# Patient Record
Sex: FEMALE | Employment: UNEMPLOYED | URBAN - METROPOLITAN AREA
[De-identification: names, ages, dates, MRNs, and addresses within clinical notes are randomized per-mention and may not be internally consistent; named-entity substitution may affect disease eponyms.]

---

## 2019-08-08 ENCOUNTER — HOSPITAL ENCOUNTER (EMERGENCY)
Facility: CLINIC | Age: 29
Discharge: HOME | End: 2019-08-09
Attending: EMERGENCY MEDICINE | Admitting: EMERGENCY MEDICINE
Payer: COMMERCIAL

## 2019-08-08 DIAGNOSIS — Z11.4 ENCOUNTER FOR HIV (HUMAN IMMUNODEFICIENCY VIRUS) TEST: ICD-10-CM

## 2019-08-08 DIAGNOSIS — R51.9 NONINTRACTABLE EPISODIC HEADACHE, UNSPECIFIED HEADACHE TYPE: ICD-10-CM

## 2019-08-08 PROCEDURE — 99283 EMERGENCY DEPT VISIT LOW MDM: CPT | Performed by: EMERGENCY MEDICINE

## 2019-08-08 PROCEDURE — 99283 EMERGENCY DEPT VISIT LOW MDM: CPT | Mod: Z6 | Performed by: EMERGENCY MEDICINE

## 2019-08-08 PROCEDURE — 25000132 ZZH RX MED GY IP 250 OP 250 PS 637: Performed by: EMERGENCY MEDICINE

## 2019-08-08 RX ORDER — METOCLOPRAMIDE HYDROCHLORIDE 5 MG/ML
10 INJECTION INTRAMUSCULAR; INTRAVENOUS ONCE
Status: DISCONTINUED | OUTPATIENT
Start: 2019-08-08 | End: 2019-08-08

## 2019-08-08 RX ORDER — IBUPROFEN 600 MG/1
600 TABLET, FILM COATED ORAL ONCE
Status: COMPLETED | OUTPATIENT
Start: 2019-08-08 | End: 2019-08-08

## 2019-08-08 RX ADMIN — IBUPROFEN 600 MG: 600 TABLET ORAL at 23:57

## 2019-08-08 ASSESSMENT — ENCOUNTER SYMPTOMS
PHOTOPHOBIA: 0
CHILLS: 0
NAUSEA: 1
HEADACHES: 1
CONSTIPATION: 0
DIARRHEA: 0
DYSURIA: 0
FEVER: 0
ABDOMINAL PAIN: 1

## 2019-08-08 NOTE — ED AVS SNAPSHOT
[facility], [place], Emergency Department  [address]  [place] [address]  Phone:  [phone]  Fax:  [phone]                                    Irena Velasco   MRN: [id number]    Department:  [facility], Emergency Department   Date of Visit:  8/8/2019           After Visit Summary Signature Page    I have received my discharge instructions, and my questions have been answered. I have discussed any challenges I see with this plan with the nurse or doctor.    ..........................................................................................................................................  Patient/Patient Representative Signature      ..........................................................................................................................................  Patient Representative Print Name and Relationship to Patient    ..................................................               ................................................  Date                                   Time    ..........................................................................................................................................  Reviewed by Signature/Title    ...................................................              ..............................................  Date                                               Time          22EPIC Rev 08/18

## 2019-08-09 VITALS
RESPIRATION RATE: 16 BRPM | TEMPERATURE: 97.5 F | DIASTOLIC BLOOD PRESSURE: 57 MMHG | HEART RATE: 62 BPM | OXYGEN SATURATION: 100 % | SYSTOLIC BLOOD PRESSURE: 106 MMHG | WEIGHT: 182 LBS

## 2019-08-09 LAB
ALBUMIN UR-MCNC: NEGATIVE MG/DL
APPEARANCE UR: CLEAR
BILIRUB UR QL STRIP: NEGATIVE
COLOR UR AUTO: YELLOW
GLUCOSE UR STRIP-MCNC: NEGATIVE MG/DL
HCG UR QL: NEGATIVE
HGB UR QL STRIP: ABNORMAL
KETONES UR STRIP-MCNC: NEGATIVE MG/DL
LEUKOCYTE ESTERASE UR QL STRIP: NEGATIVE
MUCOUS THREADS #/AREA URNS LPF: PRESENT /LPF
NITRATE UR QL: NEGATIVE
PH UR STRIP: 8 PH (ref 5–7)
RBC #/AREA URNS AUTO: 6 /HPF (ref 0–2)
SOURCE: ABNORMAL
SP GR UR STRIP: 1.01 (ref 1–1.03)
SQUAMOUS #/AREA URNS AUTO: 1 /HPF (ref 0–1)
UROBILINOGEN UR STRIP-MCNC: NORMAL MG/DL (ref 0–2)
WBC #/AREA URNS AUTO: <1 /HPF (ref 0–5)

## 2019-08-09 PROCEDURE — 81001 URINALYSIS AUTO W/SCOPE: CPT | Performed by: EMERGENCY MEDICINE

## 2019-08-09 PROCEDURE — 84145 PROCALCITONIN (PCT): CPT | Performed by: EMERGENCY MEDICINE

## 2019-08-09 PROCEDURE — 81025 URINE PREGNANCY TEST: CPT | Performed by: EMERGENCY MEDICINE

## 2019-08-09 NOTE — ED NOTES
Patient refusing IV insertion, IV fluids, med at this time.  Reports she wants to see the doctor before anything is done.

## 2019-08-09 NOTE — DISCHARGE INSTRUCTIONS
Please make an appointment to follow up with Your Primary Care Provider in 3-4 days as needed.    You will be called if your HIV test results positive and you require follow up. You will not be called if the results are normal and you do not have HIV.

## 2019-08-09 NOTE — ED PROVIDER NOTES
History     Chief Complaint   Patient presents with     Headache     Pt c/o HA x 2 days. Pt c/o nausea.     The history is provided by the patient. A  was used (iPad, [employer]).     Irena Velasco is an otherwise healthy 29 year old female who presents for evaluation of a headache and abdominal pain and concern for HIV infection. Patient reports she's had these pains for the last 2 days. Her abdominal pain is lower, along her midline, and has been associated with some mild vaginal discharge. She denies dysuria or vaginal bleeding. Patient is sexually active, though is not concerned for an STD. However, she does reports her LMP was on 6/30 and that she completely missed her July period. Patient states they're usually regular.      Her headache is mild, bilateral, and constant for the last 2 nights. She states she's had similar headaches before, though they're usually intermittent. Patient denies photophobia or auditory sensitivity. Denies diarrhea, constipation, fevers, or chills but is somewhat nauseated.     Of note, patient is concerned she may have HIV. Her sister, who had been recently diagnosed with HIV, had visited from [place], sharing utensils, cups, nail clippers, toothbrushes, etc.    History reviewed. No pertinent past medical history.    History reviewed. No pertinent surgical history.    History reviewed. No pertinent family history.    Social History     Tobacco Use     Smoking status: Never Smoker     Smokeless tobacco: Never Used   Substance Use Topics     Alcohol use: Not on file       No current facility-administered medications for this encounter.      No current outpatient medications on file.      No Known Allergies    I have reviewed the Medications, Allergies, Past Medical and Surgical History, and Social History in the Epic system.    Review of Systems   Constitutional: Negative for chills and fever.   Eyes: Negative for photophobia.   Gastrointestinal: Positive for abdominal  pain and nausea. Negative for constipation and diarrhea.   Genitourinary: Positive for vaginal discharge. Negative for dysuria and vaginal bleeding.   Neurological: Positive for headaches.   All other systems reviewed and are negative.      Physical Exam   BP: 108/40  Pulse: 77  Temp: 97.5  F (36.4  C)  Resp: 16  Weight: 82.6 kg (182 lb)  SpO2: 100 %      Physical Exam   Constitutional: She is oriented to person, place, and time. She appears well-developed and well-nourished. No distress.   HENT:   Head: Normocephalic and atraumatic.   Nose: Nose normal.   Mouth/Throat: Oropharynx is clear and moist.   Eyes: Pupils are equal, round, and reactive to light. Conjunctivae and EOM are normal. No scleral icterus.   Neck: Normal range of motion. Neck supple.   Cardiovascular: Normal rate.   Pulmonary/Chest: Effort normal. No stridor. No respiratory distress.   Abdominal: Soft. She exhibits no distension. There is no tenderness. There is no rebound and no guarding. No hernia.   Musculoskeletal: Normal range of motion. She exhibits no deformity.   Neurological: She is alert and oriented to person, place, and time. No cranial nerve deficit or sensory deficit. She exhibits normal muscle tone.   Skin: Skin is warm and dry. She is not diaphoretic. No pallor.   Psychiatric: She has a normal mood and affect. Her behavior is normal.   Nursing note and vitals reviewed.      ED Course        Procedures       10:54 PM  The patient was seen and examined by Dr. Parham in Room 19.          Critical Care time:  none             Labs Ordered and Resulted from Time of ED Arrival Up to the Time of Departure from the ED   ROUTINE UA WITH MICROSCOPIC REFLEX TO CULTURE - Abnormal; Notable for the following components:       Result Value    Blood Urine Small (*)     pH Urine 8.0 (*)     RBC Urine 6 (*)     Mucous Urine Present (*)     All other components within normal limits   HCG QUALITATIVE URINE   HIV ANTIGEN ANTIBODY COMBO   PERIPHERAL IV  CATHETER            Assessments & Plan (with Medical Decision Making)   Irena Velasco is an otherwise healthy 29 year old female who presents for evaluation of a headache and abdominal pain and concern for HIV infection.    Ddx: HIV exposure/infection, tension HA, early pregnancy, vaginosis, UTI, viral syndrome, psychosomatic sxs    Patient declines pelvic exam for STD testing.    Patient reports normal contact with sister who recently found out she was HIV+. Reassured patient she is at low risk for acquiring HIV from sharing cups/untensils etc. HIV testing sent and patient informed that she would be called if results were abnormal and she required follow up. UPT neg. UA wnl. HA resolved with motrin. Discharged home. Will call with abnormal results. F/u with PCP as needed. Return precautions provided.     I have reviewed the nursing notes.    I have reviewed the findings, diagnosis, plan and need for follow up with the patient.       Medication List      There are no discharge medications for this visit.         Final diagnoses:   Nonintractable episodic headache, unspecified headache type   Encounter for HIV (human immunodeficiency virus) test   I, Channing Stone, am serving as a trained medical scribe to document services personally performed by Sandy Parham MD, based on the provider's statements to me.   ISandy MD, was physically present and have reviewed and verified the accuracy of this note documented by Channing Stone.    8/8/2019   [facility], [place], EMERGENCY DEPARTMENT     Sandy Parham MD  08/09/19 0203

## 2020-04-02 ENCOUNTER — APPOINTMENT (OUTPATIENT)
Dept: INTERPRETER SERVICES | Facility: CLINIC | Age: 30
End: 2020-04-02
Payer: COMMERCIAL